# Patient Record
Sex: FEMALE | Race: BLACK OR AFRICAN AMERICAN | NOT HISPANIC OR LATINO | Employment: PART TIME | URBAN - METROPOLITAN AREA
[De-identification: names, ages, dates, MRNs, and addresses within clinical notes are randomized per-mention and may not be internally consistent; named-entity substitution may affect disease eponyms.]

---

## 2017-02-14 ENCOUNTER — RECORDS - HEALTHEAST (OUTPATIENT)
Dept: LAB | Facility: CLINIC | Age: 47
End: 2017-02-14

## 2017-02-15 LAB — HBA1C MFR BLD: 7.1 % (ref 4.2–6.1)

## 2018-01-23 ENCOUNTER — OFFICE VISIT - HEALTHEAST (OUTPATIENT)
Dept: FAMILY MEDICINE | Facility: CLINIC | Age: 48
End: 2018-01-23

## 2018-01-23 DIAGNOSIS — M43.6 TORTICOLLIS: ICD-10-CM

## 2018-01-23 RX ORDER — AMLODIPINE BESYLATE 10 MG/1
10 TABLET ORAL DAILY
Status: SHIPPED | COMMUNITY
Start: 2018-01-23

## 2018-01-23 RX ORDER — CYCLOBENZAPRINE HCL 5 MG
5 TABLET ORAL 3 TIMES DAILY PRN
Qty: 40 TABLET | Refills: 0 | Status: SHIPPED | OUTPATIENT
Start: 2018-01-23

## 2018-01-23 RX ORDER — LISINOPRIL 10 MG/1
10 TABLET ORAL DAILY
Status: SHIPPED | COMMUNITY
Start: 2018-01-23

## 2021-05-28 ENCOUNTER — RECORDS - HEALTHEAST (OUTPATIENT)
Dept: ADMINISTRATIVE | Facility: CLINIC | Age: 51
End: 2021-05-28

## 2021-05-31 VITALS — WEIGHT: 203.2 LBS

## 2021-06-15 NOTE — PROGRESS NOTES
"  Chief Complaint   Patient presents with     Neck Pain     she thinks she slept wrong a couple nights back has been trying advil but hasnt helped. going into the shoulder area all on the right side        HPI     Yesika Morris is a 47 y.o. female seen today for muscle spasms on the right side of her neck.  2 days ago she woke with pain and tightness in the right side of her neck.  Pain increases dramatically when she turns her head to the right.  Symptoms continued throughout that they did not respond to NSAIDs.  This morning she saw a massage therapist who felt a \"lump\" on the right side of her neck and suggested she seek medical attention.  She has not had any similar symptoms in the past.  Denies numbness or tingling in her arms.  Denies fever, chills, nausea.     Current Outpatient Prescriptions   Medication Sig Dispense Refill     amLODIPine (NORVASC) 10 MG tablet Take 10 mg by mouth daily.       cyclobenzaprine (FLEXERIL) 5 MG tablet Take 1 tablet (5 mg total) by mouth 3 (three) times a day as needed for muscle spasms. 40 tablet 0     lisinopril (PRINIVIL,ZESTRIL) 10 MG tablet Take 10 mg by mouth daily.       No current facility-administered medications for this visit.         Reviewed and updated: medical history, medications and allergies.     Review of Systems     General: Denies fever, chills, fatigue.  MSK: Pain, tightness on R side of neck.     Objective     Vitals:    01/23/18 1556   BP: 120/80   Patient Site: Right Arm   Patient Position: Sitting   Cuff Size: Adult Regular   Pulse: 62   Resp: 14   Temp: 98.2  F (36.8  C)   TempSrc: Oral   SpO2: 99%   Weight: 203 lb 3.2 oz (92.2 kg)        Reviewed vital signs.  General: Appears calm, visibly uncomfortable. Answers questions quickly and appropriately with clear speech. No apparent distress.  Skin: Pink, warm, dry.  HENT: Normocephalic, atraumatic.  Neck: Right lateral cervical muscles are extremely tight and tender to touch.  Turning her head to the " right of midline triggers a significant increase in pain.  There is no erythema, edema, warmth.  Axial loading of the cervical spine does not cause any increase in pain or trigger shooting pain in the arms.  Heart: Strong, regular radial pulse.  Lungs: Normal respiratory effort.  Neuro: Memory and cognition appear normal. Normal gait.  Psych: Mood and affect appear normal.     Medical Decision-Making     Yesika is an otherwise healthy 47-year-old female presents with torticollis.  Physical exam is consistent with muscle spasms in the lateral cervical muscles on the right.  No neurovascular findings in the right or left upper extremities.  No exam findings concerning for infection and no symptoms of systemic infection.  Discussed the causes and expected clinical course of torticollis.  Reviewed symptomatic and conservative management including NSAIDs, heat, massaging with ice cubes.  Discussed the risks and benefits of using cyclobenzaprine, along with the limited expectation of benefit.  She requested to proceed with a trial of cyclobenzaprine.  She will begin with a 5 mg dose today and if she does not experience improvement she will try 10 mg dose tomorrow.  If symptoms are not resolving over the next 5-7 days she will follow-up with her PCP.      Reviewed red flags that would trigger a prompt return to the clinic as noted below under patient instructions.  They expressed understanding of these directions and are in agreement with the plan.     Assessment and Plan     Yesika was seen today for neck pain.    Diagnoses and all orders for this visit:    Torticollis  -     cyclobenzaprine (FLEXERIL) 5 MG tablet; Take 1 tablet (5 mg total) by mouth 3 (three) times a day as needed for muscle spasms.      Patient Instructions   Follow up with your regular doctor if you're not feeling noticeably better in 3 - 4 days.    If the pain suddenly gets worse, come back to the walk-in clinic.  If you develop a fever, chills, or  nausea, go to the emergency room.      Discussed benefit vs risk of medications, dosing, side effects.  Patient was able to verbalize understanding.  After visit summary was provided for patient.     Balwinder Liao PA-C